# Patient Record
Sex: FEMALE | Race: WHITE | NOT HISPANIC OR LATINO | Employment: FULL TIME | URBAN - METROPOLITAN AREA
[De-identification: names, ages, dates, MRNs, and addresses within clinical notes are randomized per-mention and may not be internally consistent; named-entity substitution may affect disease eponyms.]

---

## 2023-03-16 ENCOUNTER — TELEPHONE (OUTPATIENT)
Dept: ALLERGY | Facility: CLINIC | Age: 53
End: 2023-03-16
Payer: COMMERCIAL

## 2023-03-16 NOTE — TELEPHONE ENCOUNTER
Spoke with patient iot has been years since she has seen dr brewer  and I let her know she will need a referral to be seen.    ----- Message from Sidra Paulson MA sent at 3/16/2023  3:38 PM CDT -----  Looks like this is a new patient. Please ask patient to get referral from PCP. They may submit through [facility] or fax to us at [phone].  ----- Message -----  From: Tati Venegas MA  Sent: 3/16/2023  10:47 AM CDT  To: Sidra Paulson MA      ----- Message -----  From: Karen Cifuentes  Sent: 3/16/2023  10:05 AM CDT  To: J[facility] Wheeler Staff    Pt would like to schedule an appt. Call back number is [phone]. x. EL

## 2024-02-07 ENCOUNTER — TELEPHONE (OUTPATIENT)
Dept: ALLERGY | Facility: CLINIC | Age: 54
End: 2024-02-07
Payer: COMMERCIAL

## 2024-02-07 NOTE — TELEPHONE ENCOUNTER
Spoke with pt. She would like a sooner appt if possible, with any provider. Pt rescheduled to see Dr Low on 2/15.

## 2024-02-07 NOTE — TELEPHONE ENCOUNTER
----- Message from Estelita Nicole sent at 2/7/2024 11:57 AM CST -----  Contact: pt  Type:  Sooner Apoointment Request    Caller is requesting a sooner appointment.  Caller declined first available appointment listed below.  Caller will not accept being placed on the waitlist and is requesting a message be sent to doctor.  Name of Caller: pt  When is the first available appointment? Pt is shelby for 3/7 but need something sooner  Symptoms: referral/ severe allergic reaction/rash on face  Would the patient rather a call back or a response via [facility]?  phone  Best Call Back Number: [phone]  Additional Information: pt is will to see any provider

## 2024-02-15 ENCOUNTER — OFFICE VISIT (OUTPATIENT)
Dept: ALLERGY | Facility: CLINIC | Age: 54
End: 2024-02-15
Payer: COMMERCIAL

## 2024-02-15 ENCOUNTER — LAB VISIT (OUTPATIENT)
Dept: LAB | Facility: HOSPITAL | Age: 54
End: 2024-02-15
Attending: STUDENT IN AN ORGANIZED HEALTH CARE EDUCATION/TRAINING PROGRAM
Payer: COMMERCIAL

## 2024-02-15 VITALS
HEART RATE: 79 BPM | WEIGHT: 177 LBS | TEMPERATURE: 99 F | OXYGEN SATURATION: 98 % | DIASTOLIC BLOOD PRESSURE: 82 MMHG | HEIGHT: 66 IN | SYSTOLIC BLOOD PRESSURE: 135 MMHG | BODY MASS INDEX: 28.45 KG/M2

## 2024-02-15 DIAGNOSIS — B99.9 RECURRENT INFECTIONS: ICD-10-CM

## 2024-02-15 DIAGNOSIS — J31.0 CHRONIC RHINITIS: Primary | ICD-10-CM

## 2024-02-15 DIAGNOSIS — L50.1 CHRONIC IDIOPATHIC URTICARIA: ICD-10-CM

## 2024-02-15 DIAGNOSIS — J45.40 MODERATE PERSISTENT ASTHMA WITHOUT COMPLICATION: ICD-10-CM

## 2024-02-15 LAB
BASOPHILS # BLD AUTO: 0.03 K/UL (ref 0–0.2)
BASOPHILS NFR BLD: 0.5 % (ref 0–1.9)
DIFFERENTIAL METHOD BLD: NORMAL
EOSINOPHIL # BLD AUTO: 0.2 K/UL (ref 0–0.5)
EOSINOPHIL NFR BLD: 3.6 % (ref 0–8)
ERYTHROCYTE [DISTWIDTH] IN BLOOD BY AUTOMATED COUNT: 12.6 % (ref 11.5–14.5)
HCT VFR BLD AUTO: 43.6 % (ref 37–48.5)
HGB BLD-MCNC: 14.4 G/DL (ref 12–16)
IGA SERPL-MCNC: 365 MG/DL (ref 40–350)
IGG SERPL-MCNC: 1006 MG/DL (ref 650–1600)
IGM SERPL-MCNC: 53 MG/DL (ref 50–300)
IMM GRANULOCYTES # BLD AUTO: 0.02 K/UL (ref 0–0.04)
IMM GRANULOCYTES NFR BLD AUTO: 0.3 % (ref 0–0.5)
LYMPHOCYTES # BLD AUTO: 1.8 K/UL (ref 1–4.8)
LYMPHOCYTES NFR BLD: 29 % (ref 18–48)
MCH RBC QN AUTO: 30.8 PG (ref 27–31)
MCHC RBC AUTO-ENTMCNC: 33 G/DL (ref 32–36)
MCV RBC AUTO: 93 FL (ref 82–98)
MONOCYTES # BLD AUTO: 0.6 K/UL (ref 0.3–1)
MONOCYTES NFR BLD: 10 % (ref 4–15)
NEUTROPHILS # BLD AUTO: 3.5 K/UL (ref 1.8–7.7)
NEUTROPHILS NFR BLD: 56.6 % (ref 38–73)
NRBC BLD-RTO: 0 /100 WBC
PLATELET # BLD AUTO: 278 K/UL (ref 150–450)
PMV BLD AUTO: 10.5 FL (ref 9.2–12.9)
RBC # BLD AUTO: 4.68 M/UL (ref 4–5.4)
WBC # BLD AUTO: 6.17 K/UL (ref 3.9–12.7)

## 2024-02-15 PROCEDURE — 99204 OFFICE O/P NEW MOD 45 MIN: CPT | Mod: 25,S$GLB,, | Performed by: STUDENT IN AN ORGANIZED HEALTH CARE EDUCATION/TRAINING PROGRAM

## 2024-02-15 PROCEDURE — 82785 ASSAY OF IGE: CPT | Performed by: STUDENT IN AN ORGANIZED HEALTH CARE EDUCATION/TRAINING PROGRAM

## 2024-02-15 PROCEDURE — 1159F MED LIST DOCD IN RCRD: CPT | Mod: CPTII,S$GLB,, | Performed by: STUDENT IN AN ORGANIZED HEALTH CARE EDUCATION/TRAINING PROGRAM

## 2024-02-15 PROCEDURE — 86360 T CELL ABSOLUTE COUNT/RATIO: CPT | Performed by: STUDENT IN AN ORGANIZED HEALTH CARE EDUCATION/TRAINING PROGRAM

## 2024-02-15 PROCEDURE — 82785 ASSAY OF IGE: CPT | Mod: 91 | Performed by: STUDENT IN AN ORGANIZED HEALTH CARE EDUCATION/TRAINING PROGRAM

## 2024-02-15 PROCEDURE — 86317 IMMUNOASSAY INFECTIOUS AGENT: CPT | Mod: 59 | Performed by: STUDENT IN AN ORGANIZED HEALTH CARE EDUCATION/TRAINING PROGRAM

## 2024-02-15 PROCEDURE — 82784 ASSAY IGA/IGD/IGG/IGM EACH: CPT | Mod: 59 | Performed by: STUDENT IN AN ORGANIZED HEALTH CARE EDUCATION/TRAINING PROGRAM

## 2024-02-15 PROCEDURE — 85025 COMPLETE CBC W/AUTO DIFF WBC: CPT | Performed by: STUDENT IN AN ORGANIZED HEALTH CARE EDUCATION/TRAINING PROGRAM

## 2024-02-15 PROCEDURE — 3008F BODY MASS INDEX DOCD: CPT | Mod: CPTII,S$GLB,, | Performed by: STUDENT IN AN ORGANIZED HEALTH CARE EDUCATION/TRAINING PROGRAM

## 2024-02-15 PROCEDURE — 3075F SYST BP GE 130 - 139MM HG: CPT | Mod: CPTII,S$GLB,, | Performed by: STUDENT IN AN ORGANIZED HEALTH CARE EDUCATION/TRAINING PROGRAM

## 2024-02-15 PROCEDURE — 94664 DEMO&/EVAL PT USE INHALER: CPT | Mod: S$GLB,,, | Performed by: STUDENT IN AN ORGANIZED HEALTH CARE EDUCATION/TRAINING PROGRAM

## 2024-02-15 PROCEDURE — G0009 ADMIN PNEUMOCOCCAL VACCINE: HCPCS | Mod: S$GLB,,, | Performed by: STUDENT IN AN ORGANIZED HEALTH CARE EDUCATION/TRAINING PROGRAM

## 2024-02-15 PROCEDURE — 86162 COMPLEMENT TOTAL (CH50): CPT | Performed by: STUDENT IN AN ORGANIZED HEALTH CARE EDUCATION/TRAINING PROGRAM

## 2024-02-15 PROCEDURE — 36415 COLL VENOUS BLD VENIPUNCTURE: CPT | Performed by: STUDENT IN AN ORGANIZED HEALTH CARE EDUCATION/TRAINING PROGRAM

## 2024-02-15 PROCEDURE — 84443 ASSAY THYROID STIM HORMONE: CPT | Performed by: STUDENT IN AN ORGANIZED HEALTH CARE EDUCATION/TRAINING PROGRAM

## 2024-02-15 PROCEDURE — 90732 PPSV23 VACC 2 YRS+ SUBQ/IM: CPT | Mod: S$GLB,,, | Performed by: STUDENT IN AN ORGANIZED HEALTH CARE EDUCATION/TRAINING PROGRAM

## 2024-02-15 PROCEDURE — 3079F DIAST BP 80-89 MM HG: CPT | Mod: CPTII,S$GLB,, | Performed by: STUDENT IN AN ORGANIZED HEALTH CARE EDUCATION/TRAINING PROGRAM

## 2024-02-15 PROCEDURE — 83520 IMMUNOASSAY QUANT NOS NONAB: CPT | Performed by: STUDENT IN AN ORGANIZED HEALTH CARE EDUCATION/TRAINING PROGRAM

## 2024-02-15 PROCEDURE — 86038 ANTINUCLEAR ANTIBODIES: CPT | Performed by: STUDENT IN AN ORGANIZED HEALTH CARE EDUCATION/TRAINING PROGRAM

## 2024-02-15 PROCEDURE — 82784 ASSAY IGA/IGD/IGG/IGM EACH: CPT | Performed by: STUDENT IN AN ORGANIZED HEALTH CARE EDUCATION/TRAINING PROGRAM

## 2024-02-15 PROCEDURE — 86161 COMPLEMENT/FUNCTION ACTIVITY: CPT | Performed by: STUDENT IN AN ORGANIZED HEALTH CARE EDUCATION/TRAINING PROGRAM

## 2024-02-15 PROCEDURE — 99999 PR PBB SHADOW E&M-EST. PATIENT-LVL IV: CPT | Mod: PBBFAC,,, | Performed by: STUDENT IN AN ORGANIZED HEALTH CARE EDUCATION/TRAINING PROGRAM

## 2024-02-15 PROCEDURE — 87389 HIV-1 AG W/HIV-1&-2 AB AG IA: CPT | Performed by: STUDENT IN AN ORGANIZED HEALTH CARE EDUCATION/TRAINING PROGRAM

## 2024-02-15 PROCEDURE — 1160F RVW MEDS BY RX/DR IN RCRD: CPT | Mod: CPTII,S$GLB,, | Performed by: STUDENT IN AN ORGANIZED HEALTH CARE EDUCATION/TRAINING PROGRAM

## 2024-02-15 RX ORDER — LEVOTHYROXINE SODIUM 150 UG/1
150 TABLET ORAL
COMMUNITY

## 2024-02-15 RX ORDER — FLUTICASONE FUROATE AND VILANTEROL 200; 25 UG/1; UG/1
1 POWDER RESPIRATORY (INHALATION) DAILY
COMMUNITY

## 2024-02-15 RX ORDER — AZELASTINE 1 MG/ML
1 SPRAY, METERED NASAL 2 TIMES DAILY
Qty: 30 ML | Refills: 11 | Status: SHIPPED | OUTPATIENT
Start: 2024-02-15 | End: 2025-02-14

## 2024-02-15 RX ORDER — ERGOCALCIFEROL 1.25 MG/1
50000 CAPSULE ORAL
COMMUNITY

## 2024-02-15 RX ORDER — FLUTICASONE PROPIONATE 50 MCG
1 SPRAY, SUSPENSION (ML) NASAL 2 TIMES DAILY
Qty: 16 G | Refills: 11 | Status: SHIPPED | OUTPATIENT
Start: 2024-02-15 | End: 2025-02-14

## 2024-02-15 NOTE — ASSESSMENT & PLAN NOTE
- Not controlled at this time   - Ordered Flonase 1 SEN BID   - Ordered Astelin 1 SEN BID   - Educated on the proper use of intranasal sprays   - Ordered Serum IgE to Zone 6 Aeroallergens   - Will continue to monitor and reassess

## 2024-02-15 NOTE — ASSESSMENT & PLAN NOTE
- Improved since starting maintenance inhaler again   - Continue Breo and Singular   - Educated on proper use of inhalers including an in-person demonstration of proper technique  - Expressed understanding of demonstrated technique  - ED precautions discussed at length   - Will continue to monitor and reassess

## 2024-02-15 NOTE — PROGRESS NOTES
Allergy and Immunology  New Patient Clinic Note    Date: 2/15/2024  Chief Complaint   Patient presents with    Allergic Reaction     Pt states that for the last past 10 days she has been having allergic type of reaction and has a rash on her face and arms. Also pt wants to get help on how to control her asthma.      Referred by: Corbin Desouza, P  [address] [place] [address]    History  Eli Zuluaga is a 53 y.o. female being seen as a New Patient today.    Chronic Rhinitis   - Onset: Childhood   - Symptoms: Congestion, rhinorrhea, sneezing, PND   - Suspected triggers include: environmental - cat is an issue   - Pattern: Perennial with Seasonal Exacerbations  - Medications: Montelukast and intermittent use of antihistamines     Chronic or Inducible Urticaria  - No hx of chronic urticaria     Moderate Persistent Asthma   - Onset: Lifelong   - Symptoms: Wheezing, SOB   - Medications: Breo, improvement   - PO Steroids: About 1 year ago last burst but multiple in lifetime  - Hospitalization/Intubation: No   - Suspected triggers include: Environmental/viral   - Smoking: No     CRSwNP  - hx of sinus polyps   - Prior surgeries      Eczema   - No hx of eczema     Eosinophilic Esophagitis  - No hx of eosinophilic esophagitis     Food Allergy  - No hx of food allergy    Drug Allergy  - Bactrim: rash     Recurrent Infections  - Multiple pneumonias - reported around ten times   - Sinus infections multiple times in the past     Venom Allergy  - No hx of venom allergy    Allergies, PMH, PSH, Social, and Family History were reviewed.    Review of patient's allergies indicates:   Allergen Reactions    Sulfa (sulfonamide antibiotics) Rash      No past medical history on file.  No past surgical history on file.  Social History     Social History Narrative    Not on file     S/he reports that she has never smoked. She has never used smokeless tobacco. No history on file for alcohol use and drug use.    Current  Outpatient Medications on File Prior to Visit   Medication Sig Dispense Refill    ergocalciferol (VITAMIN D2) 50,000 unit Cap Take 50,000 Units by mouth every 7 days.      fluticasone furoate-vilanteroL (BREO ELLIPTA) 200-25 mcg/dose DsDv diskus inhaler Inhale 1 puff into the lungs once daily. Controller      levothyroxine (SYNTHROID) 150 MCG tablet Take 150 mcg by mouth before breakfast.       No current facility-administered medications on file prior to visit.     Physical Examination  Vitals:    02/15/24 1452   BP: 135/82   Pulse: 79   Temp: 98.8 °F (37.1 °C)     GENERAL:  female in no apparent distress and well developed and well nourished  HEAD:  Normocephalic, without obvious abnormality, atraumatic  EYES: sclera anicteric, conjunctiva normochromic  EARS: normal TM's and external ear canals both ears  NOSE: without erythema or discharge, clear discharge, turbinates normal    OROPHARYNX: moist mucous membranes without erythema, exudates or petechiae  LYMPH NODES: normal, supple, no lymphadenopathy  LUNGS: clear to auscultation, no wheezes, rales or rhonchi, symmetric air entry.  HEART: normal rate, regular rhythm, normal S1, S2, no murmurs, rubs, clicks or gallops.  ABDOMEN: soft, nontender, nondistended, no masses or organomegaly.  MUSCULOSKELETAL: no gross joint deformity or swelling.  NEURO: alert, oriented, normal speech, no focal findings or movement disorder noted.  SKIN: Erythematous skin, urticaria, and scratch marks     Assessment/Plan:   Problem List Items Addressed This Visit          ENT    Chronic rhinitis - Primary    Current Assessment & Plan     - Not controlled at this time   - Ordered Flonase 1 SEN BID   - Ordered Astelin 1 SEN BID   - Educated on the proper use of intranasal sprays   - Ordered Serum IgE to Zone 6 Aeroallergens   - Will continue to monitor and reassess          Relevant Medications    azelastine (ASTELIN) 137 mcg (0.1 %) nasal spray    fluticasone propionate (FLONASE) 50  mcg/actuation nasal spray       Derm    Chronic idiopathic urticaria    Current Assessment & Plan     - Recommended Allegra 360 mg BID   - Ordered labs at this time          Relevant Orders    CU (Chronic Urticaria) Index Panel    LAUAR    Allergen Profile, Zone 6    Tryptase       Pulmonary    Moderate persistent asthma without complication    Current Assessment & Plan     - Improved since starting maintenance inhaler again   - Continue Breo and Singular   - Educated on proper use of inhalers including an in-person demonstration of proper technique  - Expressed understanding of demonstrated technique  - ED precautions discussed at length   - Will continue to monitor and reassess            ID    Recurrent infections    Overview     - Multiple pneumonias - reported around ten times   - Sinus infections multiple times in the past          Current Assessment & Plan     - Immune evaluation at this time with emphasis on humoral evaluation   - PPSV23 at this time          Relevant Orders    HIV 1/2 Ag/Ab (4th Gen)    CBC Auto Differential    Streptococcus Pneumoniae IgG Antibody (23 Serotypes), MAID    IgE    IgG    IgM    IgA    (In Office Administered) Pneumococcal Polysaccharide Vaccine (23 Valent) (SQ/IM) (Completed)    Lynphocyte Subset Panel 7 - Congenital Immunodeficiencies    Complement, Total    Complement, Alternate Pathway (AH50)     Follow up:  Follow up in about 4 weeks (around 3/14/2024).    Alex Lwo MD   [facility]  Allergy and Immunology

## 2024-02-16 ENCOUNTER — TELEPHONE (OUTPATIENT)
Dept: ALLERGY | Facility: CLINIC | Age: 54
End: 2024-02-16
Payer: COMMERCIAL

## 2024-02-16 LAB
ANA SER QL IF: NORMAL
HIV 1+2 AB+HIV1 P24 AG SERPL QL IA: NORMAL
IGE SERPL-ACNC: <35 IU/ML (ref 0–100)

## 2024-02-16 NOTE — TELEPHONE ENCOUNTER
----- Message from Keyon Mcneal sent at 2/16/2024  9:53 AM CST -----  Contact: Eli Benitez is needing a call back in regards to receiving a pneumonia injection. She stated that her shoulder is very swollen this morning. Please give her a call back at [phone]

## 2024-02-17 LAB
ANNOTATION COMMENT IMP: NORMAL
CD19 CELLS NFR SPEC: 22 % (ref 6–23)
CD2 CELLS # BLD: 1586 CELLS/UL (ref 700–2600)
CD2 CELLS NFR BLD: 77 % (ref 73–91)
CD3 CELLS # BLD: 1448 CELLS/UL (ref 570–2400)
CD3 CELLS NFR SPEC: 73 % (ref 62–87)
CD3+CD4+ CELLS # BLD: 947 CELLS/UL (ref 430–1800)
CD3+CD4+ CELLS NFR BLD: 48 % (ref 32–64)
CD3+CD4+ CELLS/CD3+CD8+ CLL BLD: 2 RATIO (ref 0.8–3.9)
CD3+CD8+ CELLS # BLD: 473 CELLS/UL (ref 210–1200)
CD3+CD8+ CELLS NFR SPEC: 24 % (ref 15–46)
CD3-CD16+CD56+ CELLS # SPEC: 94 CELLS/UL (ref 78–470)
CD3-CD16+CD56+ CELLS NFR SPEC: 5 % (ref 4–26)
CD4+CD45RA+ CELLS # BLD: 545 CELLS/UL (ref 150–870)
CD4+CD45RO+ CELLS # BLD: 362 CELLS/UL (ref 190–1050)
CD4+CD45RO+ CELLS NFR BLD: 40 % (ref 28–72)
CD45RA CELLS NFR BLD: 60 % (ref 28–71)
CELLS.CD3-CD19+ [#/VOLUME] IN BLOOD: 440 CELLS/UL (ref 91–610)
HLA-DR+ CELLS # BLD: 457 CELLS/UL (ref 100–640)
HLA-DR+ CELLS NFR BLD: 22 % (ref 8–24)

## 2024-02-19 LAB
A ALTERNATA IGE QN: <0.1 KU/L
A FUMIGATUS IGE QN: <0.1 KU/L
AH50 ACT/NOR SER IA: 75 %OF NORM
ALLERGEN BOXELDER MAPLE TREE IGE: <0.1 KU/L
ALLERGEN MAPLE (BOX ELDER) CLASS: ABNORMAL
ALLERGEN MULBERRY CLASS: ABNORMAL
ALLERGEN MULBERRY TREE IGE: <0.1 KU/L
ALLERGEN PIGWEED IGE: <0.1 KU/L
ALLERGEN WALNUT TREE IGE: <0.1 KU/L
BERMUDA GRASS IGE QN: <0.1 KU/L
C HERBARUM IGE QN: <0.1 KU/L
CAT DANDER IGE QN: 3.91 KU/L
CH50 SERPL-ACNC: 57 U/ML (ref 42–95)
COMMON PIGWEED CLASS: ABNORMAL
COMMON RAGWEED IGE QN: <0.1 KU/L
D FARINAE IGE QN: <0.1 KU/L
D PTERONYSS IGE QN: <0.1 KU/L
DEPRECATED A ALTERNATA IGE RAST QL: ABNORMAL
DEPRECATED A FUMIGATUS IGE RAST QL: ABNORMAL
DEPRECATED BERMUDA GRASS IGE RAST QL: ABNORMAL
DEPRECATED C HERBARUM IGE RAST QL: ABNORMAL
DEPRECATED CAT DANDER IGE RAST QL: ABNORMAL
DEPRECATED COMMON RAGWEED IGE RAST QL: ABNORMAL
DEPRECATED D FARINAE IGE RAST QL: ABNORMAL
DEPRECATED D PTERONYSS IGE RAST QL: ABNORMAL
DEPRECATED DOG DANDER IGE RAST QL: ABNORMAL
DEPRECATED ELDER IGE RAST QL: ABNORMAL
DEPRECATED MOUSE URINE PROT IGE RAST QL: ABNORMAL
DEPRECATED MT JUNIPER IGE RAST QL: ABNORMAL
DEPRECATED P NOTATUM IGE RAST QL: ABNORMAL
DEPRECATED PECAN/HICK TREE IGE RAST QL: ABNORMAL
DEPRECATED ROACH IGE RAST QL: ABNORMAL
DEPRECATED SILVER BIRCH IGE RAST QL: ABNORMAL
DEPRECATED TIMOTHY IGE RAST QL: ABNORMAL
DEPRECATED WHITE ELM IGE RAST QL: ABNORMAL
DEPRECATED WHITE OAK IGE RAST QL: ABNORMAL
DOG DANDER IGE QN: 1.01 KU/L
ELDER IGE QN: <0.1 KU/L
IGE: 47.1 IU/ML
MOUSE URINE PROT IGE QN: <0.1 KU/L
MT JUNIPER IGE QN: 0.25 KU/L
P NOTATUM IGE QN: <0.1 KU/L
PECAN/HICK TREE IGE QN: <0.1 KU/L
RAST ALLERGEN INTERPRETATION: ABNORMAL
ROACH IGE QN: <0.1 KU/L
SILVER BIRCH IGE QN: <0.1 KU/L
TIMOTHY IGE QN: <0.1 KU/L
TRYPTASE LEVEL: 11.1 NG/ML
WALNUT TREE CLASS: ABNORMAL
WHITE ELM IGE QN: <0.1 KU/L
WHITE OAK IGE QN: <0.1 KU/L

## 2024-02-22 LAB
IMMUNOLOGIST REVIEW: NORMAL
S PN DA SERO 19F IGG SER-MCNC: 64.2 MCG/ML
S PNEUM DA 1 IGG SER-MCNC: 1.8 MCG/ML
S PNEUM DA 10A IGG SER-MCNC: 16.9 MCG/ML
S PNEUM DA 11A IGG SER-MCNC: 3.6 MCG/ML
S PNEUM DA 12F IGG SER-MCNC: 1.2 MCG/ML
S PNEUM DA 14 IGG SER-MCNC: 78.6 MCG/ML
S PNEUM DA 15B IGG SER-MCNC: 3.6 MCG/ML
S PNEUM DA 17F IGG SER-MCNC: 1.4 MCG/ML
S PNEUM DA 18C IGG SER-MCNC: 8.7 MCG/ML
S PNEUM DA 19A IGG SER-MCNC: >100 MCG/ML
S PNEUM DA 2 IGG SER-MCNC: 26.2 MCG/ML
S PNEUM DA 20A IGG SER-MCNC: 6 MCG/ML
S PNEUM DA 22F IGG SER-MCNC: 1.6 MCG/ML
S PNEUM DA 23F IGG SER-MCNC: 4.4 MCG/ML
S PNEUM DA 3 IGG SER-MCNC: 0.3 MCG/ML
S PNEUM DA 33F IGG SER-MCNC: 5.6 MCG/ML
S PNEUM DA 4 IGG SER-MCNC: 1.9 MCG/ML
S PNEUM DA 5 IGG SER-MCNC: 3.1 MCG/ML
S PNEUM DA 6B IGG SER-MCNC: 3.7 MCG/ML
S PNEUM DA 7F IGG SER-MCNC: 1.5 MCG/ML
S PNEUM DA 8 IGG SER-MCNC: 7.8 MCG/ML
S PNEUM DA 9N IGG SER-MCNC: 1.8 MCG/ML
S PNEUM DA 9V IGG SER-MCNC: 0.8 MCG/ML

## 2024-02-23 LAB
CU INDEX: <2.8
CU, ANTI-THYROGLOBULIN IGG: <20 IU/ML
CU, ANTI-THYROID PEROXIDASE IGG: 37 IU/ML
CU, TSH (THYROTROPIN): 1.79 UIU/ML (ref 0.4–4)